# Patient Record
(demographics unavailable — no encounter records)

---

## 2025-01-22 NOTE — DISCUSSION/SUMMARY
[Medication Risks Reviewed] : Medication risks reviewed [1 Week] : in 1 week [de-identified] : Patient was prescribed Medrol Dosepak and gabapentin, as well as an MRI.  She was instructed not to take a nonsteroidal anti-inflammatory when she is on the Medrol Dosepak and can reinstitute it once it is completed.  She should discontinue methocarbamol completely.  F/u in a week after the MRI.  I, Dr. Giorgio Lockwood, personally performed the evaluation and management (E/M) services for this new patient.  That E/M includes conducting the initial examination, assessing all conditions, and establishing a plan of care.  Today, my medical scribe, Dru Hickman, was here to observe my evaluation and management services for this patient to be followed going forward.

## 2025-01-22 NOTE — PHYSICAL EXAM
[Antalgic] : antalgic [Wide-Based] : wide-based [Wheelchair] : uses a wheelchair [LE] : 5/5 motor strength in bilateral lower extremities [] : Sensory: [L4-LT] : L4 [L5-LT] : L5 [S1-LT] : S1 [Acute Distress] : in acute distress [Obese] : obese [Normal] : Oriented to person, place, and time, insight and judgement were intact and the affect was normal [de-identified] : positive contra lateral stray leg raise. negative contra lateral stray leg raise [de-identified] : reflexes are 1/4  left knee reflex is very slow, almost nonexistent [de-identified] : a/p lateral xrays were reviewed lumbar spine, obtained 1/2/25 show discogenic changes, narrowing L4-L5 disc space  a/p lateral flexion extension xrays obtained today came today show narrowing of disc space L4-L5 without gross instability.  There is significant end-stage degenerative arthritis of the L5-S1 disc space with spontaneous ankylosis.  a/p pelvis x-rays obtained today shows no hip arthritis

## 2025-01-22 NOTE — HISTORY OF PRESENT ILLNESS
[Worsening] : worsening [___ mths] : [unfilled] month(s) ago [10] : a current pain level of 10/10 [de-identified] : 55 y/o F w/PMHx of spinal surgery (2014) in the form of a laminectomy discectomy at L5-S1 is here for an initial consultation regarding low back/leg pain. She has had pain for the past 2 months, which has been worsening over the past several days.  Last friday when going down the stairs, she missed a step and heard a crack. This pain starts in the lower back and radiates down her left leg.  She denies any significant radicular symptoms into the right leg. She has been treating the pain with oxycodone, roboxin, and ibuprofen but it hasn't been helping which was provided to her during an emergency room visit recently. She experiences numbness in her L toes.

## 2025-01-29 NOTE — HISTORY OF PRESENT ILLNESS
[Pain Location] : pain [] : left leg [Lumbar] : lumbar region [Worsening] : worsening [___ wks] : [unfilled] week(s) ago [10] : a current pain level of 10/10 [Constant] : ~He/She~ states the symptoms seem to be constant [Recumbency] : relieved by recumbency [de-identified] : Review of the MRI of the lumbar spine with continued pain into the left leg and into the toe numbness. Patient stated that the medrol dose pack has not helped with the pain at all and has declined another prescription on today's office visit. Patient is currently taking gabapentin and feels it is not helping with the pain, Tylenol with minimal temporary relief.

## 2025-01-29 NOTE — HISTORY OF PRESENT ILLNESS
[Pain Location] : pain [] : left leg [Lumbar] : lumbar region [Worsening] : worsening [___ wks] : [unfilled] week(s) ago [10] : a current pain level of 10/10 [Constant] : ~He/She~ states the symptoms seem to be constant [Recumbency] : relieved by recumbency [de-identified] : Review of the MRI of the lumbar spine with continued pain into the left leg and into the toe numbness. Patient stated that the medrol dose pack has not helped with the pain at all and has declined another prescription on today's office visit. Patient is currently taking gabapentin and feels it is not helping with the pain, Tylenol with minimal temporary relief.

## 2025-01-29 NOTE — PHYSICAL EXAM
[Knee] : patellar 2+ and symmetric bilaterally [Ankle] : ankle 2+ and symmetric bilaterally [Normal] : No costovertebral angle tenderness and no spinal tenderness [Antalgic] : antalgic [Wide-Based] : wide-based [LE] : Sensory: Intact in bilateral lower extremities [de-identified] : MRI of the lumbar spine done on 1/26/2025 EXAM: [id number] - MR SPINE LUMBAR  - ORDERED BY: LANETTE HE   PROCEDURE DATE:  01/07/2025    INTERPRETATION:  Exam Type: MR LUMBAR SPINE Exam Date and Time: 1/7/2025 9:23 AM Indication: Status post spinal fusion. Pain. Additional Clinical Information: Other Condition see Clinical Info Comparison: Lumbar spine x-rays 7/19/2012.  TECHNIQUE: Multiplanar multisequence MRI of the lumbar spine was performed.  FINDINGS: OSSEOUS STRUCTURES: Patient is status post posterior decompression and fusion at L4-L5. Susceptibility artifact accompanies the hardware which is otherwise intact. There is mild retrolisthesis of L2 over L3 and of L3 over L4. Mild anterolisthesis of L5 over S1. Vertebral body heights are preserved without compression deformity. Degenerative endplate changes are seen at L1-L2 and L2-L3 anteriorly. Background marrow signal is normal without infiltrative marrow process. No suspicious osseous lesions are identified. The sacroiliac joints are normal.  SPINAL CANAL: The conus is normal in caliber and signal, terminating at the L1 level. There is bunching of the nerve roots in the thecal sac related to spinal canal narrowing.  EXTRASPINAL: Expected postoperative scarring is noted in the dorsal paraspinous soft tissues. There is symmetric fatty infiltration of the lower dorsal paraspinous musculature. Gallstones within the gallbladder. Left renal cyst. Colonic diverticulosis.  DISC SPACES: There is multilevel degenerative disc disease characterized by loss of normal disc height and signal. Level-by-level analysis demonstrates the following:  T11-T12 (included on sagittal images only): No disc bulge or herniation. Ligamentum flavum thickening. No spinal canal or neural foraminal narrowing.  T12-L1: Disc bulge with small central disc protrusion.. No spinal canal or neural foraminal narrowing.  L1-L2: Disc bulge with left paracentral disc protrusion. No right foraminal narrowing. Mild left foraminal narrowing. Effacement of the ventral thecal sac without spinal canal narrowing.  L2-L3: Large disc bulge. Ligamentum flavum thickening. The bilateral subarticular zones. Moderate to severe right and moderate left foraminal narrowing. Moderate spinal canal narrowing.  L3-L4: Disc osteophyte complex. Bilateral facet joint arthrosis and ligamentum flavum thickening. Effacement of the bilateral subarticular zones. Severe bilateral foraminal narrowing. Severe spinal canal narrowing.  L4-L5: Postsurgical changes. Disc bulge. Ligamentum flavum thickening and bilateral facet arthrosis. Severe right and left foraminal narrowing. No spinal canal narrowing.  L5-S1: Disc bulge. Bilateral facet joint arthrosis and ligamentum flavum thickening. Severe bilateral foraminal narrowing. Effacement of the ventral thecal sac without spinal canal narrowing.   IMPRESSION: Postsurgical changes at L4-L5 with multilevel degenerative changes resulting in up to severe spinal canal narrowing and severe neural foraminal narrowing, preferentially at L3-L4, as described.  --- End of Report ---       VITA LOERA MD; Attending Radiologist This document has been electronically signed. Jan 9 2025  8:59AM   Personal review of this MRI shows a central and left-sided herniated disc at L5-S1 consistent with the patient's symptoms.

## 2025-01-29 NOTE — DISCUSSION/SUMMARY
[Medication Risks Reviewed] : Medication risks reviewed [Surgical risks reviewed] : Surgical risks reviewed [de-identified] : Patient is given a choice today since her reflexes of bilateral knees are still intact. Patient is choosing to undergo an KHANH with Dr Bledsoe first before undergoing considering another surgical procedure in the form of microdiscectomy at L5-S1 on the left.  I, Dr. Giorgio Lockwood, personally performed the evaluation and management (E/M) services for this new patient.  That E/M includes conducting the initial examination, assessing all conditions, and establishing a plan of care.  Today, my ACP, Kyra Aviles, was here to observe my evaluation and management services for this patient to be followed going forward.

## 2025-01-29 NOTE — DISCUSSION/SUMMARY
[Medication Risks Reviewed] : Medication risks reviewed [Surgical risks reviewed] : Surgical risks reviewed [de-identified] : Patient is given a choice today since her reflexes of bilateral knees are still intact. Patient is choosing to undergo an KHANH with Dr Bledsoe first before undergoing considering another surgical procedure in the form of microdiscectomy at L5-S1 on the left.  I, Dr. Giorgio Lockwood, personally performed the evaluation and management (E/M) services for this new patient.  That E/M includes conducting the initial examination, assessing all conditions, and establishing a plan of care.  Today, my ACP, Kyra Aviles, was here to observe my evaluation and management services for this patient to be followed going forward.

## 2025-01-29 NOTE — DISCUSSION/SUMMARY
[Medication Risks Reviewed] : Medication risks reviewed [Surgical risks reviewed] : Surgical risks reviewed [de-identified] : Patient is given a choice today since her reflexes of bilateral knees are still intact. Patient is choosing to undergo an KHANH with Dr Bledsoe first before undergoing considering another surgical procedure in the form of microdiscectomy at L5-S1 on the left.  I, Dr. Giorgio Lockwood, personally performed the evaluation and management (E/M) services for this new patient.  That E/M includes conducting the initial examination, assessing all conditions, and establishing a plan of care.  Today, my ACP, Kyra Aviles, was here to observe my evaluation and management services for this patient to be followed going forward.

## 2025-01-29 NOTE — PHYSICAL EXAM
[Knee] : patellar 2+ and symmetric bilaterally [Ankle] : ankle 2+ and symmetric bilaterally [Normal] : No costovertebral angle tenderness and no spinal tenderness [Antalgic] : antalgic [Wide-Based] : wide-based [LE] : Sensory: Intact in bilateral lower extremities [de-identified] : MRI of the lumbar spine done on 1/26/2025 EXAM: [id number] - MR SPINE LUMBAR  - ORDERED BY: LANETTE HE   PROCEDURE DATE:  01/07/2025    INTERPRETATION:  Exam Type: MR LUMBAR SPINE Exam Date and Time: 1/7/2025 9:23 AM Indication: Status post spinal fusion. Pain. Additional Clinical Information: Other Condition see Clinical Info Comparison: Lumbar spine x-rays 7/19/2012.  TECHNIQUE: Multiplanar multisequence MRI of the lumbar spine was performed.  FINDINGS: OSSEOUS STRUCTURES: Patient is status post posterior decompression and fusion at L4-L5. Susceptibility artifact accompanies the hardware which is otherwise intact. There is mild retrolisthesis of L2 over L3 and of L3 over L4. Mild anterolisthesis of L5 over S1. Vertebral body heights are preserved without compression deformity. Degenerative endplate changes are seen at L1-L2 and L2-L3 anteriorly. Background marrow signal is normal without infiltrative marrow process. No suspicious osseous lesions are identified. The sacroiliac joints are normal.  SPINAL CANAL: The conus is normal in caliber and signal, terminating at the L1 level. There is bunching of the nerve roots in the thecal sac related to spinal canal narrowing.  EXTRASPINAL: Expected postoperative scarring is noted in the dorsal paraspinous soft tissues. There is symmetric fatty infiltration of the lower dorsal paraspinous musculature. Gallstones within the gallbladder. Left renal cyst. Colonic diverticulosis.  DISC SPACES: There is multilevel degenerative disc disease characterized by loss of normal disc height and signal. Level-by-level analysis demonstrates the following:  T11-T12 (included on sagittal images only): No disc bulge or herniation. Ligamentum flavum thickening. No spinal canal or neural foraminal narrowing.  T12-L1: Disc bulge with small central disc protrusion.. No spinal canal or neural foraminal narrowing.  L1-L2: Disc bulge with left paracentral disc protrusion. No right foraminal narrowing. Mild left foraminal narrowing. Effacement of the ventral thecal sac without spinal canal narrowing.  L2-L3: Large disc bulge. Ligamentum flavum thickening. The bilateral subarticular zones. Moderate to severe right and moderate left foraminal narrowing. Moderate spinal canal narrowing.  L3-L4: Disc osteophyte complex. Bilateral facet joint arthrosis and ligamentum flavum thickening. Effacement of the bilateral subarticular zones. Severe bilateral foraminal narrowing. Severe spinal canal narrowing.  L4-L5: Postsurgical changes. Disc bulge. Ligamentum flavum thickening and bilateral facet arthrosis. Severe right and left foraminal narrowing. No spinal canal narrowing.  L5-S1: Disc bulge. Bilateral facet joint arthrosis and ligamentum flavum thickening. Severe bilateral foraminal narrowing. Effacement of the ventral thecal sac without spinal canal narrowing.   IMPRESSION: Postsurgical changes at L4-L5 with multilevel degenerative changes resulting in up to severe spinal canal narrowing and severe neural foraminal narrowing, preferentially at L3-L4, as described.  --- End of Report ---       VITA LOERA MD; Attending Radiologist This document has been electronically signed. Jan 9 2025  8:59AM   Personal review of this MRI shows a central and left-sided herniated disc at L5-S1 consistent with the patient's symptoms.

## 2025-01-29 NOTE — REASON FOR VISIT
[Follow-Up Visit] : a follow-up visit for [Back Pain] : back pain [Radiculopathy] : radiculopathy [FreeTextEntry2] : MRI of the lumbar spine

## 2025-01-29 NOTE — HISTORY OF PRESENT ILLNESS
[Pain Location] : pain [] : left leg [Lumbar] : lumbar region [Worsening] : worsening [___ wks] : [unfilled] week(s) ago [10] : a current pain level of 10/10 [Constant] : ~He/She~ states the symptoms seem to be constant [Recumbency] : relieved by recumbency [de-identified] : Review of the MRI of the lumbar spine with continued pain into the left leg and into the toe numbness. Patient stated that the medrol dose pack has not helped with the pain at all and has declined another prescription on today's office visit. Patient is currently taking gabapentin and feels it is not helping with the pain, Tylenol with minimal temporary relief.

## 2025-03-05 NOTE — ADDENDUM
[FreeTextEntry1] :  I, Christinaezra Lindd, acted solely as a scribe for Dr. Giorgio Lockwood on this date 03/05/2025 .  All medical records entries made by the Scribe were at my Dr. Giorgio Lockwood direction and personally dictated by me on 03/05/2025. I have reviewed the chart and agree that the record accurately reflects my personal performance of the history, physical exam, assessment and plan. I have also personally directed, reviewed, and agreed with the chart.

## 2025-03-05 NOTE — HISTORY OF PRESENT ILLNESS
[de-identified] :  Patients presents for a follow up visit for Lumbar HNP & Degenerative arthritis of Lumbar spine. The patient states that she had one KHANH injection since she was last here and notes no real relief. The KHANH injection was on 02/10/2025. She notes bilateral leg weakness.  [de-identified] : Movement

## 2025-03-05 NOTE — DISCUSSION/SUMMARY
[Surgical risks reviewed] : Surgical risks reviewed [PRN] : PRN [de-identified] :  I have discussed the underlying pathophysiology of the patient's condition. I informed the patient that surgery will be required to provide them long term relief from their symptoms. The proper pre and post operative procedures and expectations were discussed in extensive detail with the patient. We had a lengthy discussion. I have provided internet- based patient education materials. Patient would like to move forward with surgery.   Benefit & Risks of operative and non-operative treatment and their alternatives for the patient's pathology were outlines at the length with the patient. Specifically, those risks are understood to be but not limited to bleeding, infection, fracture, (both intra-operative & post-operative), adjacent level disease, failure to heal (significantly increased by smoking), need for further surgery, failure of instrumentation (if used), reoccurrence of pathology, neurovascular injury, dural tears if leaks, need for additional procedures, and anesthetic risk including death.   There may be a need for additional incision to acquire bone graft which also bears similar periopertaive risks. In additon, the patient further understands that there may be indicated need for somatosensory evoked potential monitoring, real-time EMG monitoring, and motor evoked potenital monitoring during th epocedure along with placement of needle electrodes.   A through discussion with spinal models, when needed, was conducted of the surgical procedure including the use of specific implants and bone graft types. Internet based patient education materials and spine care map was provided.   The patient was verbalized an understanding of the procedure, it indications and its common potential complications and attendant risks and is willing to proceed. No guarantees were made with regard to complete recovery. We will process in a timely manner after obtaining appropriate medical clearances.

## 2025-03-05 NOTE — PHYSICAL EXAM
[Antalgic] : antalgic [Wide-Based] : wide-based [LE] : Sensory: Intact in bilateral lower extremities [Knee] : patellar 2+ and symmetric bilaterally [Ankle] : ankle 2+ and symmetric bilaterally [Normal] : No costovertebral angle tenderness and no spinal tenderness

## 2025-03-20 NOTE — HISTORY OF PRESENT ILLNESS
[de-identified] : 53yo F referred for prolonged PTT.   She is planned for spinal surgery (Microdiscectomy L4-L5 left) with Dr. Lockwood on Tuesday 3/25. Preop labs showed a prolonged PTT at 42.3. In , she also had spinal surgery (laminectomy discectomy at L5-S1) and PTT was prolonged then (PTT was 40.7). No bleeding or clotting complications postop  She also had a  in .   Only meds are gabapentin and pain meds.  She is getting  today.

## 2025-03-20 NOTE — ASSESSMENT
[FreeTextEntry1] : 55yo F referred for prolonged PTT.  She is planned for spinal surgery (Microdiscectomy L4-L5 left) with Dr. Lockwood on Tuesday 3/25. Preop labs showed a prolonged PTT at 42.3. In 2014, she also had spinal surgery (laminectomy discectomy at L5-S1) and PTT was prolonged then (PTT was 40.7). No bleeding or clotting complications.   Will check mixing studies, factors VIII, IX and XI, and APLS labs Hematology clearance pending above labs  CBC wnl - results reviewed  All questions answered

## 2025-06-18 NOTE — HISTORY OF PRESENT ILLNESS
[___ Weeks Post Op] : [unfilled] weeks post op [9] : the patient reports pain that is 9/10 in severity [Fair Pain Control] : has fair pain control [Neuro Intact] : an unremarkable neurological exam [Regressing] : is regressing [Chills] : no chills [Constipation] : no constipation [Diarrhea] : no diarrhea [Dysuria] : no dysuria [Fever] : no fever [Nausea] : no nausea [Vomiting] : no vomiting [de-identified] : s/p microdiscectomy DOS: 3/25/25 [de-identified] :  Patients presents for a follow up visit for s/p microdiscectomy DOS: 3/25/25. The patient continues to have left lower extremity radiculopathy despite completing a course of physical therapy. She notes that she recently fell due to buckling of the left leg. She reports her symptoms are exacerbated with standing and walking.    [de-identified] :  I have discussed the underlying pathophysiology of the patient's condition. Yazmin was prescibed with a NSAID.  I expressed to the patient at this time, it would be in their best interest to obtain additional studies in the form of an MRI to rule out a recurrent herniated disc. I provided the patient with necessary prescription to obtain an MRI. The patient should follow up with in 1-2 weeks to review the results of the MRI & further assessment of his symptoms.

## 2025-06-18 NOTE — ADDENDUM
[FreeTextEntry1] :  I, Christina Lindd, acted solely as a scribe for Dr. Giorgio Lockwood on this date 06/18/2025 .  All medical records entries made by the Scribe were at my Dr. Giorgio Lockwood direction and personally dictated by me on 06/18/2025. I have reviewed the chart and agree that the record accurately reflects my personal performance of the history, physical exam, assessment and plan. I have also personally directed, reviewed, and agreed with the chart.

## 2025-07-02 NOTE — PHYSICAL EXAM
[Antalgic] : antalgic [Wide-Based] : wide-based [] : Sensory: [L4-LT] : L4 [L5-LT] : L5 [Poor Appearance] : appearing poorly [Obese] : obese [Normal] : Oriented to person, place, and time, insight and judgement were intact and the affect was normal [de-identified] : There is weakness on toe off on the left, normal on the right. [de-identified] : MRI Evaluation of the Lumbar spine taken on 06/29/2025 shows __   On personal review a recurrent disc herniation at L4-L5 now producing central and left lateral recess stenosis.  This is consistent with the patient's symptoms.

## 2025-07-02 NOTE — ADDENDUM
[FreeTextEntry1] :  I, Christina Lindd, acted solely as a scribe for Dr. Giorgio Lockwood on this date 07/02/2025 .  All medical records entries made by the Scribe were at my Dr. Giorgio Lockwood direction and personally dictated by me on 07/02/2025. I have reviewed the chart and agree that the record accurately reflects my personal performance of the history, physical exam, assessment and plan. I have also personally directed, reviewed, and agreed with the chart.

## 2025-07-02 NOTE — RETURN TO WORK/SCHOOL
[FreeTextEntry1] : Patient is unable to return to work at this time will be re evaluated in one week to determine return to work date. [FreeTextEntry2] : Dr. Giorgio Lockwood

## 2025-07-02 NOTE — DISCUSSION/SUMMARY
[Surgical risks reviewed] : Surgical risks reviewed [PRN] : PRN [de-identified] :  I have discussed the underlying pathophysiology of the patient's condition. I informed the patient that surgery will be required to provide them long term relief from their symptoms and to ensure that the patient's left lower extremity does not become progressively weak. The proper pre and post operative procedures and expectations were discussed in extensive detail with the patient including benefits and risks of surgical intervention in terms of a spinal fusion to stop the recurrence of disc herniations at the L4-5 level. We had a lengthy discussion. I have provided internet- based patient education materials. The patient should follow up with me in 1 week for further assessment of symptoms and discussion of surgery if they have further questions.

## 2025-07-02 NOTE — HISTORY OF PRESENT ILLNESS
[Stable] : stable [de-identified] : Patient is here today to review the recent results of an MRI for Lumbar spine.   The patient continues to have symptoms pertaining to lower back. The patient reports no significant changes in their symptoms since their last visit.  The patient states that on several occasions the patient has experienced buckling of the left leg.  She also notices weakness of her ankle.